# Patient Record
Sex: FEMALE | Race: WHITE | Employment: UNEMPLOYED | URBAN - METROPOLITAN AREA
[De-identification: names, ages, dates, MRNs, and addresses within clinical notes are randomized per-mention and may not be internally consistent; named-entity substitution may affect disease eponyms.]

---
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
## 2021-09-11 ENCOUNTER — OFFICE VISIT (OUTPATIENT)
Dept: PRIMARY CARE CLINIC | Age: 21
End: 2021-09-11
Payer: COMMERCIAL

## 2021-09-11 VITALS
RESPIRATION RATE: 16 BRPM | DIASTOLIC BLOOD PRESSURE: 66 MMHG | SYSTOLIC BLOOD PRESSURE: 102 MMHG | TEMPERATURE: 97.6 F | BODY MASS INDEX: 31.38 KG/M2 | OXYGEN SATURATION: 98 % | HEART RATE: 88 BPM | WEIGHT: 135.6 LBS | HEIGHT: 55 IN

## 2021-09-11 DIAGNOSIS — J02.9 SORE THROAT: ICD-10-CM

## 2021-09-11 DIAGNOSIS — R05.9 COUGH: Primary | ICD-10-CM

## 2021-09-11 DIAGNOSIS — J30.9 ALLERGIC RHINITIS, UNSPECIFIED SEASONALITY, UNSPECIFIED TRIGGER: ICD-10-CM

## 2021-09-11 LAB
S PYO AG THROAT QL: NEGATIVE
SARS-COV-2 POC: NEGATIVE
VALID INTERNAL CONTROL?: YES

## 2021-09-11 PROCEDURE — 87426 SARSCOV CORONAVIRUS AG IA: CPT | Performed by: NURSE PRACTITIONER

## 2021-09-11 PROCEDURE — 99213 OFFICE O/P EST LOW 20 MIN: CPT | Performed by: NURSE PRACTITIONER

## 2021-09-11 PROCEDURE — 87880 STREP A ASSAY W/OPTIC: CPT | Performed by: NURSE PRACTITIONER

## 2021-09-11 RX ORDER — ESCITALOPRAM OXALATE 20 MG/1
TABLET ORAL
COMMUNITY
Start: 2021-08-20 | End: 2022-03-05 | Stop reason: ALTCHOICE

## 2021-09-11 NOTE — PROGRESS NOTES
SUBJECTIVE:   Ronald Arnett is a 22 y.o. female who complains of congestion, sore throat, post nasal drip and thick cough for 14 days. She denies a history of cough and congestion and sore throat. and denies a history of asthma. Patient denies smoke cigarettes. Cough worsened last night making her vomit. Took Delsym x 2-3 doses. No fever. Throat sore from coughing. Has had COVID vaccine. COVID tested in vehicle per protocol with [facility] PCR test before entering building for in person visit    ROS  All negative except those mentioned in HPI    OBJECTIVE:  Visit Vitals  /66 (BP 1 Location: Left arm, BP Patient Position: Sitting, BP Cuff Size: Adult)   Pulse 88   Temp 97.6 °F (36.4 °C) (Temporal)   Resp 16   Ht (!) 5.4\" (0.137 m)   Wt 135 lb 9.6 oz (61.5 kg)   LMP 09/06/2021 (Approximate) Comment: STARTED A WEEK AND A HALF AGO ENDED ON 09/06/21   SpO2 98%   BMI 3269.45 kg/m²       She appears well, vital signs are as noted. Ears normal.  Left TM scarring. Throat and pharynx normal.  Mild erythema, no exudate. Neck supple. No adenopathy in the neck. Nose is congested. Sinuses non tender. The chest is clear, without wheezes or rales. Results for orders placed or performed in visit on 09/11/21   AMB POC SARS-COV-2   Result Value Ref Range    SARS-COV-2 POC Negative Negative     Strep test negative  ASSESSMENT:     ICD-10-CM ICD-9-CM    1. Cough  R05 786.2 AMB POC SARS-COV-2      AMB POC RAPID STREP A   2. Allergic rhinitis, unspecified seasonality, unspecified trigger  J30.9 477.9    3. Sore throat  J02.9 462 AMB POC SARS-COV-2      AMB POC RAPID STREP A           PLAN:  Please add Flonase to allergy regimen  Robitussin DM for cough  Increase fluids  No evidence of bacterial infection  Symptomatic therapy suggested: push fluids, rest, gargle warm salt water and return office visit prn if symptoms persist or worsen. Lack of antibiotic effectiveness discussed with her.  Call or return to clinic prn if these symptoms worsen or fail to improve as anticipated. It was a pleasure to see you in the office today. Please call if you have any further questions or concerns. I am available through the portal system.      Signed By: RAFAEL Sequeira     September 11, 2021

## 2021-09-11 NOTE — PROGRESS NOTES
Samantha Driscoll is a 22 y.o. female    Room:5    Chief Complaint   Patient presents with    Cough     Pt c/o cough,congestion,and sore throat. Pt states \" realy bad cough and congestion, started x2 weeks ago, cough started getting bad lastnight, has taken delcium\". Visit Vitals  /66 (BP 1 Location: Left arm, BP Patient Position: Sitting, BP Cuff Size: Adult)   Pulse 88   Temp 97.6 °F (36.4 °C) (Temporal)   Resp 16   Ht (!) 5.4\" (0.137 m)   Wt 135 lb 9.6 oz (61.5 kg)   SpO2 98%   BMI 3269.45 kg/m²       Pain Scale: 0 - No pain/10    1. Have you been to the ER, urgent care clinic since your last visit? Hospitalized since your last visit? NO    2. Have you seen or consulted any other health care providers outside of the [address] [place] [address] since your last visit? Include any pap smears or colon screening.  NO

## 2021-09-11 NOTE — LETTER
NOTIFICATION RETURN TO WORK / SCHOOL    9/11/2021 1:42 PM    Ms. Pk Ochoa  [address] [facility] [address]  [phone]      To Whom It May Concern:    Pk Ochoa is currently under the care of [address] [place] [address]. She was tested for COVID with a PCR test. Negative test  Results for orders placed or performed in visit on 09/11/21   AMB POC SARS-COV-2   Result Value Ref Range    SARS-COV-2 POC Negative Negative         If there are questions or concerns please have the patient contact our office.         Sincerely,      RAFAEL Louie

## 2021-09-11 NOTE — PATIENT INSTRUCTIONS
Cough: Care Instructions  Your Care Instructions     A cough is your body's response to something that bothers your throat or airways. Many things can cause a cough. You might cough because of a cold or the flu, bronchitis, or asthma. Smoking, postnasal drip, allergies, and stomach acid that backs up into your throat also can cause coughs. A cough is a symptom, not a disease. Most coughs stop when the cause, such as a cold, goes away. You can take a few steps at home to cough less and feel better. Follow-up care is a key part of your treatment and safety. Be sure to make and go to all appointments, and call your doctor if you are having problems. It's also a good idea to know your test results and keep a list of the medicines you take. How can you care for yourself at home? · Drink lots of water and other fluids. This helps thin the mucus and soothes a dry or sore throat. Honey or lemon juice in hot water or tea may ease a dry cough. · Take cough medicine as directed by your doctor. · Prop up your head on pillows to help you breathe and ease a dry cough. · Try cough drops to soothe a dry or sore throat. Cough drops don't stop a cough. Medicine-flavored cough drops are no better than candy-flavored drops or hard candy. · Do not smoke. Avoid secondhand smoke. If you need help quitting, talk to your doctor about stop-smoking programs and medicines. These can increase your chances of quitting for good. When should you call for help? Call 911 anytime you think you may need emergency care. For example, call if:    · You have severe trouble breathing. Call your doctor now or seek immediate medical care if:    · You cough up blood.     · You have new or worse trouble breathing.     · You have a new or higher fever.     · You have a new rash.    Watch closely for changes in your health, and be sure to contact your doctor if:    · You cough more deeply or more often, especially if you notice more mucus or a change in the color of your mucus.     · You have new symptoms, such as a sore throat, an earache, or sinus pain.     · You do not get better as expected. Where can you learn more? Go to http://www.gray.com/  Enter D279 in the search box to learn more about \"Cough: Care Instructions. \"  Current as of: October 26, 2020               Content Version: 12.8  © 2006-2021 [employer]. Care instructions adapted under license by [employer] (which disclaims liability or warranty for this information). If you have questions about a medical condition or this instruction, always ask your healthcare professional. [address] any warranty or liability for your use of this information.

## 2021-11-29 NOTE — TELEPHONE ENCOUNTER
Called and spoke with patients mother, mother advised pt was still having white patches in back of throat and intermittent fevers, mother advised she would like another medication called in. Advised she would need to bring pt back into clinic to be re-evaluated on current symptoms, advised mother of office hrs 8am-8pm. Mother advised she would bring child over in a couple of hrs. No further action needed at this time. none

## 2022-03-05 ENCOUNTER — OFFICE VISIT (OUTPATIENT)
Dept: PRIMARY CARE CLINIC | Age: 22
End: 2022-03-05

## 2022-03-05 VITALS
BODY MASS INDEX: 24.28 KG/M2 | HEART RATE: 112 BPM | TEMPERATURE: 98.3 F | HEIGHT: 64 IN | RESPIRATION RATE: 16 BRPM | OXYGEN SATURATION: 97 % | DIASTOLIC BLOOD PRESSURE: 67 MMHG | WEIGHT: 142.2 LBS | SYSTOLIC BLOOD PRESSURE: 105 MMHG

## 2022-03-05 DIAGNOSIS — H92.02 LEFT EAR PAIN: ICD-10-CM

## 2022-03-05 DIAGNOSIS — B34.9 VIRAL ILLNESS: ICD-10-CM

## 2022-03-05 DIAGNOSIS — H66.002 NON-RECURRENT ACUTE SUPPURATIVE OTITIS MEDIA OF LEFT EAR WITHOUT SPONTANEOUS RUPTURE OF TYMPANIC MEMBRANE: Primary | ICD-10-CM

## 2022-03-05 LAB — SARS-COV-2 PCR, POC: NEGATIVE

## 2022-03-05 PROCEDURE — 99213 OFFICE O/P EST LOW 20 MIN: CPT | Performed by: NURSE PRACTITIONER

## 2022-03-05 PROCEDURE — 87635 SARS-COV-2 COVID-19 AMP PRB: CPT | Performed by: NURSE PRACTITIONER

## 2022-03-05 RX ORDER — AMOXICILLIN 875 MG/1
875 TABLET, FILM COATED ORAL 2 TIMES DAILY
Qty: 20 TABLET | Refills: 0 | Status: SHIPPED | OUTPATIENT
Start: 2022-03-05 | End: 2022-03-15

## 2022-03-05 NOTE — PROGRESS NOTES
HISTORY OF PRESENT ILLNESS  Barry Flores is a 22 y.o. female. Patient with a 5 day history of left ear pain, cough, headache, congestion and sneezing. Dry throat. Congestion  Few days. Ear popped 2 days ago. Cant hear. Headache. Was tested for COVID 19 in vehicle. Negative PCR test.  Ear pain described as pressure. Drainage none. Has treated with  No history of ear infections. None recent. Over a year. Taking mucinex x 1. Zyrtec daily. Tylenol. No help     Past Medical History:   Diagnosis Date    Eczema      Past Surgical History:   Procedure Laterality Date    HX HEENT      ear tubes    HX TYMPANOSTOMY         Review of Systems   Constitutional: Negative for chills and fever. HENT: Positive for congestion and ear pain. Eyes: Negative for redness. Respiratory: Negative for cough. Gastrointestinal: Negative for abdominal pain, nausea and vomiting. Neurological: Positive for headaches. Physical Exam  Vitals and nursing note reviewed. Constitutional:       Appearance: Normal appearance. HENT:      Head: Normocephalic and atraumatic. Right Ear: Tympanic membrane, ear canal and external ear normal.      Left Ear: Decreased hearing noted. Swelling present. Tympanic membrane is injected and retracted. Nose: Congestion and rhinorrhea present. Rhinorrhea is clear. Right Sinus: No maxillary sinus tenderness. Left Sinus: No maxillary sinus tenderness. Mouth/Throat:      Pharynx: Posterior oropharyngeal erythema present. Tonsils: No tonsillar exudate. Cardiovascular:      Rate and Rhythm: Regular rhythm. Tachycardia present. Pulses: Normal pulses. Pulmonary:      Effort: Pulmonary effort is normal.      Breath sounds: Normal breath sounds. Musculoskeletal:      Cervical back: Normal range of motion. Lymphadenopathy:      Cervical: Cervical adenopathy present. Skin:     General: Skin is warm.    Neurological:      General: No focal deficit present. Mental Status: She is alert. Results for orders placed or performed in visit on 03/05/22   POCT COVID-19, SARS-COV-2, PCR   Result Value Ref Range    SARS-COV-2 PCR, POC Negative Negative       ASSESSMENT and PLAN    ICD-10-CM ICD-9-CM    1. Non-recurrent acute suppurative otitis media of left ear without spontaneous rupture of tympanic membrane  H66.002 382.00    2. Viral illness  B34.9 079.99 POCT COVID-19, SARS-COV-2, PCR   3. Left ear pain  H92.02 388.70      Encounter Diagnoses   Name Primary?  Non-recurrent acute suppurative otitis media of left ear without spontaneous rupture of tympanic membrane Yes    Viral illness     Left ear pain      Orders Placed This Encounter    POCT COVID-19, SARS-COV-2, PCR    amoxicillin (AMOXIL) 875 mg tablet   Medications as directed  Take with food. Complete entire course of prescription. Follow plan of care as discussed.   Signed By: RAFAEL Greene     March 5, 2022

## 2022-03-05 NOTE — PATIENT INSTRUCTIONS
Ear Infection (Otitis Media): Care Instructions  Overview     An ear infection may start with a cold and affect the middle ear (otitis media). It can hurt a lot. Most ear infections clear up on their own in a couple of days and do not need antibiotics. Also, antibiotics do not work against viruses, which may be the cause of your infection. Regular doses of pain relievers are the best way to reduce your fever and help you feel better. Follow-up care is a key part of your treatment and safety. Be sure to make and go to all appointments, and call your doctor if you are having problems. It's also a good idea to know your test results and keep a list of the medicines you take. How can you care for yourself at home? · Take pain medicines exactly as directed. ? If the doctor gave you a prescription medicine for pain, take it as prescribed. ? If you are not taking a prescription pain medicine, take an over-the-counter medicine, such as acetaminophen (Tylenol), ibuprofen (Advil, Motrin), or naproxen (Aleve). Read and follow all instructions on the label. ? Do not take two or more pain medicines at the same time unless the doctor told you to. Many pain medicines have acetaminophen, which is Tylenol. Too much acetaminophen (Tylenol) can be harmful. · Plan to take a full dose of pain reliever before bedtime. Getting enough sleep will help you get better. · Try a warm, moist washcloth on the ear. It may help relieve pain. · If your doctor prescribed antibiotics, take them as directed. Do not stop taking them just because you feel better. You need to take the full course of antibiotics. When should you call for help? Call your doctor now or seek immediate medical care if:    · You have new or increasing ear pain.     · You have new or increasing pus or blood draining from your ear.     · You have a fever with a stiff neck or a severe headache.    Watch closely for changes in your health, and be sure to contact your doctor if:    · You have new or worse symptoms.     · You are not getting better after taking an antibiotic for 2 days. Where can you learn more? Go to http://www.gray.com/  Enter [id number] in the search box to learn more about \"Ear Infection (Otitis Media): Care Instructions. \"  Current as of: December 2, 2020               Content Version: 13.0  © 2006-2021 [employer]. Care instructions adapted under license by [employer] (which disclaims liability or warranty for this information). If you have questions about a medical condition or this instruction, always ask your healthcare professional. [address] any warranty or liability for your use of this information.

## 2022-03-05 NOTE — PROGRESS NOTES
1. \"Have you been to the ER, urgent care clinic since your last visit? Hospitalized since your last visit? \" No    2. \"Have you seen or consulted any other health care providers outside of the [address] [place] [address] since your last visit? \" No     3. For patients aged 39-70: Has the patient had a colonoscopy / FIT/ Cologuard? No      If the patient is female:    4. For patients aged 41-77: Has the patient had a mammogram within the past 2 years? No      5. For patients aged 21-65: Has the patient had a pap smear?  No

## 2023-02-03 ENCOUNTER — OFFICE VISIT (OUTPATIENT)
Dept: PRIMARY CARE CLINIC | Age: 23
End: 2023-02-03

## 2023-02-03 VITALS
DIASTOLIC BLOOD PRESSURE: 65 MMHG | TEMPERATURE: 98.2 F | OXYGEN SATURATION: 97 % | RESPIRATION RATE: 16 BRPM | SYSTOLIC BLOOD PRESSURE: 122 MMHG | HEART RATE: 95 BPM | HEIGHT: 64 IN | BODY MASS INDEX: 24.62 KG/M2 | WEIGHT: 144.2 LBS

## 2023-02-03 DIAGNOSIS — R68.89 FLU-LIKE SYMPTOMS: ICD-10-CM

## 2023-02-03 DIAGNOSIS — J06.9 VIRAL URI: Primary | ICD-10-CM

## 2023-02-03 LAB
FLUAV+FLUBV AG NOSE QL IA.RAPID: NEGATIVE
FLUAV+FLUBV AG NOSE QL IA.RAPID: NEGATIVE
SARS-COV-2 PCR, POC: NEGATIVE
VALID INTERNAL CONTROL?: YES

## 2023-02-03 NOTE — PROGRESS NOTES
Identified pt with two pt identifiers(name and ). Reviewed record in preparation for visit and have obtained necessary documentation. Chief Complaint   Patient presents with    Cold Symptoms     X2 days; hoarse/congestion/sore throat/runny nose/headache/ear irritation/body aches; home covid today x2 - neg; taking allergy meds and ibu      Vitals:    23 1749   BP: 122/65   Pulse: 95   Resp: 16   Temp: 98.2 °F (36.8 °C)   TempSrc: Temporal   SpO2: 97%   Weight: 144 lb 3.2 oz (65.4 kg)   Height: 5' 4\" (1.626 m)   PainSc:   0 - No pain       Health Maintenance Review: Patient reminded of \"due or due soon\" health maintenance. I have asked the patient to contact his/her primary care provider (PCP) for follow-up on his/her health maintenance. Coordination of Care Questionnaire:  :   1) Have you been to an emergency room, urgent care, or hospitalized since your last visit? If yes, where when, and reason for visit? no       2. Have seen or consulted any other health care provider since your last visit? If yes, where when, and reason for visit? NO      Patient is accompanied by patient and boyfriend I have received verbal consent from Aniket Horner to discuss any/all medical information while they are present in the room.

## 2023-02-03 NOTE — PROGRESS NOTES
Chief Complaint   Patient presents with    Cold Symptoms     X2 days; hoarse/congestion/sore throat/runny nose/headache/ear irritation/body aches; home covid today x2 - neg; taking allergy meds and ibu     HISTORY OF PRESENT ILLNESS  Adriana Saab is a 25 y.o. female. She reports onset of runny nose, sore throat, headache, and body aches onset yesterday. She denies fever. Multiple sick exposures at work. She is taking allergy medications, ibuprofen. She reports two negative at home covid tests. She denies cough. Review of Systems   Constitutional: Negative. HENT:  Positive for congestion and sore throat. Respiratory: Negative. Cardiovascular: Negative. Gastrointestinal: Negative. Musculoskeletal:  Positive for myalgias. Neurological:  Positive for headaches. Physical Exam  Vitals and nursing note reviewed. HENT:      Right Ear: Tympanic membrane normal.      Left Ear: Tympanic membrane normal.      Nose: Rhinorrhea present. Mouth/Throat:      Pharynx: Oropharynx is clear. No oropharyngeal exudate or posterior oropharyngeal erythema. Comments: No localized LAD  Cardiovascular:      Rate and Rhythm: Normal rate. Pulmonary:      Effort: Pulmonary effort is normal. No respiratory distress. Breath sounds: Normal breath sounds. Musculoskeletal:      Cervical back: Neck supple. Neurological:      Mental Status: She is alert.      Past Medical History:   Diagnosis Date    Eczema      Past Surgical History:   Procedure Laterality Date    HX HEENT      ear tubes    HX TYMPANOSTOMY       /65 (BP 1 Location: Left arm, BP Patient Position: Sitting)   Pulse 95   Temp 98.2 °F (36.8 °C) (Temporal)   Resp 16   Ht 5' 4\" (1.626 m)   Wt 144 lb 3.2 oz (65.4 kg)   SpO2 97%   BMI 24.75 kg/m²      Results for orders placed or performed in visit on 02/03/23   AMB POC WILFRIDO INFLUENZA A/B TEST   Result Value Ref Range    VALID INTERNAL CONTROL POC Yes     Influenza A Ag POC Negative Negative    Influenza B Ag POC Negative Negative   POCT COVID-19, SARS-COV-2, PCR   Result Value Ref Range    SARS-COV-2 PCR, POC Negative Negative     ASSESSMENT and PLAN  1. Viral URI  2. Flu-like symptoms  -     AMB POC WILFRIDO INFLUENZA A/B TEST  Discussed with patient likely viral illness that should resolve on it's own. Recommended rest, push fluids, and take tylenol or ibuprofen for fever or symptom relief as needed. Can take OTC zyrtec-D or sudafed for nasal congestion/runny nose. Instructed to seek additional care if does not resolve or symptoms worsens.      RAFAEL Boston

## 2023-02-03 NOTE — PATIENT INSTRUCTIONS
- If your BP is normal, you can try taking Pseudoephedrine or Zrytec-D for few days  - Take an anti-inflammatory such as Ibuprofen  - Drink plenty of water

## 2023-10-06 ENCOUNTER — OFFICE VISIT (OUTPATIENT)
Age: 23
End: 2023-10-06

## 2023-10-06 VITALS
OXYGEN SATURATION: 96 % | SYSTOLIC BLOOD PRESSURE: 128 MMHG | BODY MASS INDEX: 25 KG/M2 | WEIGHT: 146.4 LBS | RESPIRATION RATE: 16 BRPM | TEMPERATURE: 96.8 F | DIASTOLIC BLOOD PRESSURE: 81 MMHG | HEIGHT: 64 IN | HEART RATE: 79 BPM

## 2023-10-06 DIAGNOSIS — J02.0 STREP THROAT: Primary | ICD-10-CM

## 2023-10-06 DIAGNOSIS — J02.9 SORE THROAT: ICD-10-CM

## 2023-10-06 LAB
GROUP A STREP ANTIGEN, POC: POSITIVE
VALID INTERNAL CONTROL, POC: YES

## 2023-10-06 RX ORDER — HYDROXYZINE HYDROCHLORIDE 25 MG/1
25 TABLET, FILM COATED ORAL DAILY PRN
COMMUNITY
Start: 2023-08-30

## 2023-10-06 RX ORDER — ESCITALOPRAM OXALATE 20 MG/1
20 TABLET ORAL DAILY
COMMUNITY
Start: 2023-08-30

## 2023-10-06 RX ORDER — AMOXICILLIN 500 MG/1
500 CAPSULE ORAL 2 TIMES DAILY
Qty: 20 CAPSULE | Refills: 0 | Status: SHIPPED | OUTPATIENT
Start: 2023-10-06 | End: 2023-10-16

## 2023-10-06 RX ORDER — BUPROPION HYDROCHLORIDE 150 MG/1
150 TABLET ORAL DAILY
COMMUNITY
Start: 2023-07-28

## 2023-10-06 NOTE — PROGRESS NOTES
Tylor Kinsey (:  2000) is a 26 y.o. female,Established patient, here for evaluation of the following chief complaint(s):  Follow-up (Sore throat)      ASSESSMENT/PLAN:  1. Strep throat  - amoxicillin (AMOXIL) 500 MG capsule; Take 1 capsule by mouth 2 times daily for 10 days  Dispense: 20 capsule; Refill: 0  - Discussed with patient typical strep course. Recommended: rest, push fluids, immune boosting vitamins and take OTC tylenol or ibuprofen for fever or symptom relief as needed. Take rx as prescribed. Additionally encouraged salt water gargles. Work noted provided. Follow if symptoms worsens/does not improve. 2. Sore throat  - AMB POC RAPID STREP A     SUBJECTIVE/OBJECTIVE:  26 y.o. female presents with symptoms of Pharyngitis  This is a new problem. Episode onset: woke up this morning with pain. The problem occurs constantly. The problem has been rapidly worsening. Associated symptoms include chills and congestion. Pertinent negatives include no fever, headaches or myalgias. Associated symptoms comments: rhinorrhea  . The symptoms are aggravated by swallowing. She has tried NSAIDs for the symptoms. The treatment provided no relief. Physical Exam  Constitutional:       Appearance: Normal appearance. She is well-developed and well-groomed. She is not ill-appearing. HENT:      Head: Normocephalic. Right Ear: Tympanic membrane normal.      Left Ear: Tympanic membrane normal.      Nose: Nose normal.      Mouth/Throat:      Mouth: Mucous membranes are moist.      Pharynx: Posterior oropharyngeal erythema present. No oropharyngeal exudate. Tonsils: Tonsillar exudate and tonsillar abscess present. 2+ on the right. 2+ on the left. Cardiovascular:      Rate and Rhythm: Normal rate. Pulses: Normal pulses. Heart sounds: Normal heart sounds. Pulmonary:      Effort: Pulmonary effort is normal.      Breath sounds: Normal breath sounds.    Lymphadenopathy:      Cervical: No cervical

## 2023-10-06 NOTE — PATIENT INSTRUCTIONS
- rest/push fluids    - salt water gargles    - take OTC tylenol/ibuprofen for fever or symptoms relief as needed.     - Consider taking immune boosting vitamins such as Natures Bounty immune 24 hours support    (in an orange bottle)